# Patient Record
Sex: MALE
[De-identification: names, ages, dates, MRNs, and addresses within clinical notes are randomized per-mention and may not be internally consistent; named-entity substitution may affect disease eponyms.]

---

## 2018-04-13 ENCOUNTER — HOSPITAL ENCOUNTER (EMERGENCY)
Dept: HOSPITAL 5 - ED | Age: 33
Discharge: HOME | End: 2018-04-13
Payer: SELF-PAY

## 2018-04-13 VITALS — DIASTOLIC BLOOD PRESSURE: 54 MMHG | SYSTOLIC BLOOD PRESSURE: 117 MMHG

## 2018-04-13 DIAGNOSIS — F17.200: ICD-10-CM

## 2018-04-13 DIAGNOSIS — L56.4: Primary | ICD-10-CM

## 2018-04-13 DIAGNOSIS — Z88.6: ICD-10-CM

## 2018-04-13 PROCEDURE — 96360 HYDRATION IV INFUSION INIT: CPT

## 2018-04-13 PROCEDURE — 87116 MYCOBACTERIA CULTURE: CPT

## 2018-04-13 PROCEDURE — 99283 EMERGENCY DEPT VISIT LOW MDM: CPT

## 2018-04-13 NOTE — EMERGENCY DEPARTMENT REPORT
ED Rash HPI





- HPI


Chief Complaint: Skin Rash


Stated Complaint: RASH


Time Seen by Provider: 04/13/18 13:35


Duration: 1 Day


Location: Neck, Chest (upper chest), Back (upper back)


Suspected Cause: Other (ultraviolet )


Rash Symptoms: Yes Peeling (mild), Yes Blistering, No Itching, No Facial 

Swelling, No Tongue/Oral Swelling, No Breathing Difficulties, No Choking 

Sensation, No Wheezing/Dyspnea, No Fever, No Lightheaded, No Malaise, No 

Myalgias


Severity: severe


Other History: 32-year-old  male comes in for severe rash with 

pustules on his arms and neck upper back.  She reports that this rash started 

yesterday.  Patient admits that he's been out in the sun for 2 days.  He 

reports that the rash is painful with some blistering.  Patient reports no past 

medical history currently takes no medication and has allergic reaction to 

tramadol.  But reports that he can have Percocet.





ED Review of Systems


ROS: 


Stated complaint: RASH


Other details as noted in HPI





Constitutional: denies: chills, fever


Eyes: denies: eye pain, eye discharge, vision change


ENT: denies: ear pain, throat pain


Respiratory: denies: cough, shortness of breath, wheezing


Cardiovascular: denies: chest pain, palpitations


Endocrine: no symptoms reported


Gastrointestinal: denies: abdominal pain, nausea, diarrhea


Genitourinary: denies: urgency, dysuria


Musculoskeletal: denies: back pain, joint swelling, arthralgia


Skin: rash, lesions (neck upper back and both arms)


Neurological: denies: headache, weakness, paresthesias


Psychiatric: denies: anxiety, depression


Hematological/Lymphatic: denies: easy bleeding, easy bruising





ED Past Medical Hx





- Past Medical History


Previous Medical History?: No





- Surgical History


Past Surgical History?: No





- Social History


Smoking Status: Current Every Day Smoker


Substance Use Type: None





- Medications


Home Medications: 


 Home Medications











 Medication  Instructions  Recorded  Confirmed  Last Taken  Type


 


Cyclobenzaprine [Flexeril] 10 mg PO TID PRN #15 tablet 04/22/16  Unknown Rx


 


Hydrocortisone 0.5% 1 applicatio TP TID #1 tube 04/13/18  Unknown Rx





[Hydrocortisone 0.5% CREAM]     


 


Ibuprofen [Motrin 800 MG tab] 800 mg PO Q8HR PRN #21 tablet 04/13/18  Unknown Rx


 


Loratadine [Claritin] 10 mg PO DAILY #30 tablet 04/13/18  Unknown Rx














Rash Exam





- Exam


General: 


Vital signs noted. No distress. Alert and acting appropriately.





HEENT: No Periorbital Edema, No Conjuctival Injection, No Chemosis, No Perioral 

Edema, No Tongue Edema, No Uvular Edema, No Compromised Airway, No Drooling


Lungs: Yes Good Air Exchange (Normal Breath Sounds), No Wheezes, No Ronchi, No 

Stridor, No Cough, No Labored Respirations, No Retractions, No Use of Accessory 

Muscles, No Other Abnormal Lung Sounds


Heart: Yes Regular, No Murmur


Skin: Yes Bulla(e) (polymorphous ), Yes Tenderness, Yes Erythema





ED Course


 Vital Signs











  04/13/18





  13:16


 


Temperature 98.4 F


 


Pulse Rate 95 H


 


Respiratory 20





Rate 


 


Blood Pressure 117/54


 


O2 Sat by Pulse 97





Oximetry 














ED Medical Decision Making





- Medical Decision Making





Patient's been evaluated by this provider faster.  I discussed the patient we'

ll give him Norco for pain.  We will give him IV 1 L.  Discussed the patient 

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  Discussed 

the patient and the treatment as she is feeling an antihistamine topical 

cortisone ibuprofen or Tylenol for pain.


Critical care attestation.: 


If time is entered above; I have spent that time in minutes in the direct care 

of this critically ill patient, excluding procedure time.








ED Disposition


Clinical Impression: 


 Polymorphous light eruption





Disposition: DC-01 TO HOME OR SELFCARE


Is pt being admited?: No


Does the pt Need Aspirin: No


Condition: Stable


Instructions:  Sunburn (ED), Sunscreen (On the skin)


Additional Instructions: 


Please take medication and use topical hydrocortisone as prescribed.  Follow up 

with her primary care provider if symptoms persist or gets worse.


Prescriptions: 


Hydrocortisone 0.5% [Hydrocortisone 0.5% CREAM] 1 applicatio TP TID #1 tube


Ibuprofen [Motrin 800 MG tab] 800 mg PO Q8HR PRN #21 tablet


 PRN Reason: Pain


Loratadine [Claritin] 10 mg PO DAILY #30 tablet


Referrals: 


PRIMARY CARE,MD [Primary Care Provider] - 3-5 Days


Forms:  Work/School Release Form(ED)